# Patient Record
Sex: MALE | Race: WHITE | URBAN - METROPOLITAN AREA
[De-identification: names, ages, dates, MRNs, and addresses within clinical notes are randomized per-mention and may not be internally consistent; named-entity substitution may affect disease eponyms.]

---

## 2020-09-29 ENCOUNTER — OFFICE VISIT (OUTPATIENT)
Dept: PRIMARY CARE CLINIC | Age: 25
End: 2020-09-29
Payer: COMMERCIAL

## 2020-09-29 VITALS
HEIGHT: 69 IN | HEART RATE: 77 BPM | BODY MASS INDEX: 24.14 KG/M2 | DIASTOLIC BLOOD PRESSURE: 76 MMHG | WEIGHT: 163 LBS | SYSTOLIC BLOOD PRESSURE: 110 MMHG | OXYGEN SATURATION: 98 % | TEMPERATURE: 97 F

## 2020-09-29 PROCEDURE — 90471 IMMUNIZATION ADMIN: CPT | Performed by: FAMILY MEDICINE

## 2020-09-29 PROCEDURE — 99385 PREV VISIT NEW AGE 18-39: CPT | Performed by: FAMILY MEDICINE

## 2020-09-29 PROCEDURE — 90686 IIV4 VACC NO PRSV 0.5 ML IM: CPT | Performed by: FAMILY MEDICINE

## 2020-09-29 RX ORDER — MULTIVIT-MIN/IRON/FOLIC ACID/K 18-600-40
2000 CAPSULE ORAL DAILY
COMMUNITY

## 2020-09-29 ASSESSMENT — PATIENT HEALTH QUESTIONNAIRE - PHQ9
2. FEELING DOWN, DEPRESSED OR HOPELESS: 0
SUM OF ALL RESPONSES TO PHQ9 QUESTIONS 1 & 2: 0
SUM OF ALL RESPONSES TO PHQ QUESTIONS 1-9: 0
SUM OF ALL RESPONSES TO PHQ QUESTIONS 1-9: 0
1. LITTLE INTEREST OR PLEASURE IN DOING THINGS: 0

## 2020-09-29 ASSESSMENT — ENCOUNTER SYMPTOMS
COUGH: 0
EYE REDNESS: 0
SORE THROAT: 0
SHORTNESS OF BREATH: 0
DIARRHEA: 0
RHINORRHEA: 0
NAUSEA: 0
ABDOMINAL PAIN: 0
VOMITING: 0
WHEEZING: 0
EYE DISCHARGE: 0

## 2020-09-29 NOTE — PROGRESS NOTES
[address] [facility] PRIMARY CARE  [address] [facility] [address]  Dept: [phone]    Lucio Sender is a 31 y.o. male who presents today for his medical conditions/complaintsas noted below. Chief Complaint   Patient presents with    New Patient    Flu Vaccine       HPI:     HPI  Pt has not seen a physician on regular basis. Has high blood pressure on moms side. Grandmother had colon cancer, grandfather had prostate cancer. Father with MS. Patient requesting to be checked for STDs. Does not believe he has had an exposure but wants to be safe. No results found for: LDLCHOLESTEROL, LDLCALC    (goal LDL is <100)   No results found for: AST, ALT, BUN  BP Readings from Last 3 Encounters:   09/29/20 110/76          (goal 120/80)    History reviewed. No pertinent past medical history. History reviewed. No pertinent surgical history. Family History   Problem Relation Age of Onset    High Blood Pressure Maternal Grandfather     Colon Cancer Paternal Grandmother     Cancer Paternal Grandfather        Social History     Tobacco Use    Smoking status: Never Smoker    Smokeless tobacco: Never Used   Substance Use Topics    Alcohol use: Not on file      Current Outpatient Medications   Medication Sig Dispense Refill    Cholecalciferol (VITAMIN D) 50 MCG (2000 UT) CAPS capsule Take 2,000 Units by mouth daily       No current facility-administered medications for this visit.       No Known Allergies    Health Maintenance   Topic Date Due    Varicella vaccine (1 of 2 - 2-dose childhood series) 02/01/1995    HPV vaccine (1 - Male 2-dose series) 02/01/2005    HIV screen  02/01/2009    DTaP/Tdap/Td vaccine (1 - Tdap) 02/01/2013    Flu vaccine (1) 09/01/2020    Hepatitis A vaccine  Aged Out    Hepatitis B vaccine  Aged Out    Hib vaccine  Aged Out    Meningococcal (ACWY) vaccine  Aged Out    Pneumococcal 0-64 years Vaccine  Aged Out       Subjective: Review of Systems   Constitutional: Negative for chills and fever. HENT: Negative for rhinorrhea and sore throat. Eyes: Negative for discharge and redness. Respiratory: Negative for cough, shortness of breath and wheezing. Cardiovascular: Negative for chest pain and palpitations. Gastrointestinal: Negative for abdominal pain, diarrhea, nausea and vomiting. Genitourinary: Negative for dysuria and frequency. Musculoskeletal: Negative for arthralgias and myalgias. Neurological: Negative for dizziness, light-headedness and headaches. Psychiatric/Behavioral: Negative for sleep disturbance. Objective:     /76   Pulse 77   Temp 97 °F (36.1 °C)   Ht 5' 9\" (1.753 m)   Wt 163 lb (73.9 kg)   SpO2 98%   BMI 24.07 kg/m²   Physical Exam  Vitals signs and nursing note reviewed. Constitutional:       General: He is not in acute distress. Appearance: He is well-developed. He is not ill-appearing. HENT:      Head: Normocephalic and atraumatic. Right Ear: External ear normal.      Left Ear: External ear normal.   Eyes:      General: No scleral icterus. Right eye: No discharge. Left eye: No discharge. Conjunctiva/sclera: Conjunctivae normal.      Pupils: Pupils are equal, round, and reactive to light. Neck:      Thyroid: No thyromegaly. Trachea: No tracheal deviation. Cardiovascular:      Rate and Rhythm: Normal rate and regular rhythm. Heart sounds: Normal heart sounds. Pulmonary:      Effort: Pulmonary effort is normal. No respiratory distress. Breath sounds: Normal breath sounds. No wheezing. Lymphadenopathy:      Cervical: No cervical adenopathy. Skin:     General: Skin is warm. Findings: No rash. Neurological:      Mental Status: He is alert and oriented to person, place, and time. Psychiatric:         Mood and Affect: Mood normal.         Behavior: Behavior normal.         Thought Content:  Thought content normal. Assessment:       Diagnosis Orders   1. Annual physical exam  Basic Metabolic Panel, Fasting   2. Need for vaccination  INFLUENZA, QUADV, 0.5ML, 6 MO AND OLDER, IM, PF, PREFILL SYR OR SDV (FLUZONE QUADV, PF)   3. Encounter for lipid screening for cardiovascular disease  Lipid, Fasting   4. Possible exposure to STD  HIV-1 And HIV-2 Antibodies    T. Pallidum Ab    Chlamydia/GC DNA, Urine        Plan:    Flu shot today. Blood work ordered. First morning urine void to rule out gonorrhea and/or chlamydia. Return in about 1 year (around 9/29/2021). Orders Placed This Encounter   Procedures    Chlamydia/GC DNA, Urine     Standing Status:   Future     Standing Expiration Date:   9/29/2021    INFLUENZA, QUADV, 0.5ML, 6 MO AND OLDER, IM, PF, PREFILL SYR OR SDV (FLUZONE QUADV, PF)    Lipid, Fasting     Standing Status:   Future     Standing Expiration Date:   9/29/2021    Basic Metabolic Panel, Fasting     Standing Status:   Future     Standing Expiration Date:   9/29/2021    HIV-1 And HIV-2 Antibodies     Standing Status:   Future     Standing Expiration Date:   9/29/2021    T. Pallidum Ab     Standing Status:   Future     Standing Expiration Date:   9/29/2021     No orders of the defined types were placed in this encounter. Patient given educationalmaterials - see patient instructions. Discussed use, benefit, and side effectsof prescribed medications. All patient questions answered. Pt voiced understanding. Reviewed health maintenance. Instructed to continue current medications, diet andexercise. Patient agreed with treatment plan. Follow up as directed.      Electronicallysigned by Loyd Mehta MD on 9/29/2020 at 11:22 AM

## 2020-10-01 LAB
ANION GAP SERPL CALCULATED.3IONS-SCNC: NORMAL MMOL/L
BUN BLDV-MCNC: NORMAL MG/DL
BUN/CREAT BLD: NORMAL
CALCIUM SERPL-MCNC: NORMAL MG/DL
CHLORIDE BLD-SCNC: NORMAL MMOL/L
CHOLESTEROL, FASTING: 190
CO2: NORMAL
CREAT SERPL-MCNC: NORMAL MG/DL
GFR AFRICAN AMERICAN: NORMAL
GFR NON-AFRICAN AMERICAN: NORMAL
GLUCOSE FASTING: 87 MG/DL
HDLC SERPL-MCNC: 45 MG/DL (ref 35–70)
HIV AG/AB: NORMAL
LDL CHOLESTEROL CALCULATED: 124 MG/DL (ref 0–160)
POTASSIUM SERPL-SCNC: NORMAL MMOL/L
SODIUM BLD-SCNC: NORMAL MMOL/L
TRIGLYCERIDE, FASTING: 103

## 2020-11-02 ENCOUNTER — TELEPHONE (OUTPATIENT)
Dept: PRIMARY CARE CLINIC | Age: 25
End: 2020-11-02

## 2020-11-02 NOTE — TELEPHONE ENCOUNTER
If he has had no exposure and has no symptoms he does not need to be tested.   If He wore a mask on the flight he should be fine

## 2020-11-02 NOTE — TELEPHONE ENCOUNTER
Pt just came back on a flight from [place]. No symptoms, but thinks he should be tested, to be on the safe side. Asking what you think? Dr. Fernandez Maldonado pt.

## 2021-03-11 ENCOUNTER — OFFICE VISIT (OUTPATIENT)
Dept: PRIMARY CARE CLINIC | Age: 26
End: 2021-03-11
Payer: COMMERCIAL

## 2021-03-11 VITALS
TEMPERATURE: 97 F | HEART RATE: 72 BPM | SYSTOLIC BLOOD PRESSURE: 116 MMHG | DIASTOLIC BLOOD PRESSURE: 78 MMHG | BODY MASS INDEX: 25.72 KG/M2 | WEIGHT: 174.2 LBS | OXYGEN SATURATION: 98 %

## 2021-03-11 DIAGNOSIS — G47.19 EXCESSIVE DAYTIME SLEEPINESS: ICD-10-CM

## 2021-03-11 DIAGNOSIS — R53.83 FATIGUE, UNSPECIFIED TYPE: ICD-10-CM

## 2021-03-11 DIAGNOSIS — G47.8 WAKES UP DURING NIGHT: ICD-10-CM

## 2021-03-11 DIAGNOSIS — R51.9 NONINTRACTABLE EPISODIC HEADACHE, UNSPECIFIED HEADACHE TYPE: Primary | ICD-10-CM

## 2021-03-11 DIAGNOSIS — G47.9 RESTLESS SLEEPER: ICD-10-CM

## 2021-03-11 PROCEDURE — G8482 FLU IMMUNIZE ORDER/ADMIN: HCPCS | Performed by: PHYSICIAN ASSISTANT

## 2021-03-11 PROCEDURE — G8427 DOCREV CUR MEDS BY ELIG CLIN: HCPCS | Performed by: PHYSICIAN ASSISTANT

## 2021-03-11 PROCEDURE — 99213 OFFICE O/P EST LOW 20 MIN: CPT | Performed by: PHYSICIAN ASSISTANT

## 2021-03-11 PROCEDURE — 1036F TOBACCO NON-USER: CPT | Performed by: PHYSICIAN ASSISTANT

## 2021-03-11 PROCEDURE — G8419 CALC BMI OUT NRM PARAM NOF/U: HCPCS | Performed by: PHYSICIAN ASSISTANT

## 2021-03-11 SDOH — ECONOMIC STABILITY: INCOME INSECURITY: HOW HARD IS IT FOR YOU TO PAY FOR THE VERY BASICS LIKE FOOD, HOUSING, MEDICAL CARE, AND HEATING?: PATIENT DECLINED

## 2021-03-11 SDOH — ECONOMIC STABILITY: FOOD INSECURITY: WITHIN THE PAST 12 MONTHS, THE FOOD YOU BOUGHT JUST DIDN'T LAST AND YOU DIDN'T HAVE MONEY TO GET MORE.: PATIENT DECLINED

## 2021-03-11 SDOH — ECONOMIC STABILITY: FOOD INSECURITY: WITHIN THE PAST 12 MONTHS, YOU WORRIED THAT YOUR FOOD WOULD RUN OUT BEFORE YOU GOT MONEY TO BUY MORE.: PATIENT DECLINED

## 2021-03-11 ASSESSMENT — PATIENT HEALTH QUESTIONNAIRE - PHQ9
SUM OF ALL RESPONSES TO PHQ QUESTIONS 1-9: 1
2. FEELING DOWN, DEPRESSED OR HOPELESS: 1
SUM OF ALL RESPONSES TO PHQ QUESTIONS 1-9: 1

## 2021-03-11 NOTE — PROGRESS NOTES
[address] [facility] PRIMARY CARE  [address] [facility] [address]  Dept: [phone]    Seb Torres is a 31 y.o. male Established patient, who presents today for his medical conditions/complaintsas noted below. Chief Complaint   Patient presents with    Headache     every other day for the last 2 weeks    Fatigue     everyday around mid-day. HPI:     HPI   Had HAs in past, but not this frequently before. Normally gets a HA 1-2x per week, but says his HAs usually improve with water. Lately he gets a HA about every day. He drinks 1.5 L of water per day without relief. No family hx of migraines. HAs are located bilat on posterior head and top of his head. No N/V, fever, or blurred vision. No change in caffeine- Only drinks an occasional tea. Rare alcohol. Ibuprofen does not help. 2 tab of naproxen sometimes helps and other times does not. Has not tried excedrin. No hx of allergies, but has noticed slight congestion in mornings recently and eye itching 1 day. Neck hurts when he has the HA. Doing yoga and Tens unit. Pt is a - Computer seems to trigger HAs some days, but also had HA on weekend. Has blue light filtering glasses, but has not been wearing them for work. Fatigue for a couple months. Sleeps 7-8 hrs per night. Drinks milk on his cereal. Takes 2000 IU Vit D year round. Has not fallen asleep while driving, but has pulled over on road d/t drowsiness if driving more than 1 hr. Says he falls asleep at his desk every afternoon and he wonders about narcolepsy. Reviewed prior notes None  Reviewed previous Labs from 10/1/20    LDL Calculated (mg/dL)   Date Value   10/01/2020 124       (goal LDL is <100)   No results found for: AST, ALT, BUN, LABA1C, TSH  BP Readings from Last 3 Encounters:   03/11/21 116/78   09/29/20 110/76          (goal 120/80)    No past medical history on file.    No past surgical movements intact. Pupils: Pupils are equal, round, and reactive to light. Neck:      Comments: 14.75\" neck circumference  Cardiovascular:      Rate and Rhythm: Normal rate and regular rhythm. Pulmonary:      Effort: Pulmonary effort is normal.      Breath sounds: Normal breath sounds. Neurological:      Mental Status: He is alert. Cranial Nerves: No cranial nerve deficit. Assessment:       Diagnosis Orders   1. Nonintractable episodic headache, unspecified headache type     2. Fatigue, unspecified type  CBC Auto Differential    Vitamin D 25 Hydroxy    TSH With Reflex Ft4   3. Excessive daytime sleepiness  Baseline Diagnostic Sleep Study   4. Restless sleeper  Baseline Diagnostic Sleep Study   5. Wakes up during night  Baseline Diagnostic Sleep Study        Plan:    1) Increased HAs  NO red flags to suggest need for immediate head imaging.   -Try zyrtec for allergies and wear blue light filter glasses at work. -Given HA dairy sheets to look for triggers.   -See if excedrin is more effective at relieving HA than naproxen. If HAs are not improving, consider Rx for Fioricet. 2-5 Excessive daytime fatigue  -Check CBC, Vit D, and TSH  -Ordered sleep study (Pt scored a 2 on STOP-BANG and 9 on [place] Sleepiness Scale). If sleep study is negative for sleep apnea, consider referral to sleep specialist to evaluate for narcolepsy. Return if symptoms worsen or fail to improve.     Orders Placed This Encounter   Procedures    CBC Auto Differential     Standing Status:   Future     Standing Expiration Date:   3/12/2022    Vitamin D 25 Hydroxy     Standing Status:   Future     Standing Expiration Date:   3/11/2022    TSH With Reflex Ft4     Standing Status:   Future     Standing Expiration Date:   3/11/2022    Baseline Diagnostic Sleep Study     Standing Status:   Future     Standing Expiration Date:   3/11/2022     Order Specific Question:   Adult or Pediatric     Answer:   Adult Study (>7 Years)     Order Specific Question:   Location For Sleep Study     Answer: [facility] Specific Question:   Select Sleep Lab Location     Answer:   [address]     Order Specific Question:   Pre-Study Patient Questions: Answer:   Complains of daytime sleepiness     Order Specific Question:   Pre-Study Patient Questions: Answer: Tosses and Turns all night or describes their sleep as restless     Order Specific Question:   Pre-Study Patient Questions: Answer:   Reports multiple awakenings throughout the night     Order Specific Question:   Pre-Study Patient Questions: Answer:   Impaired attention, concentration, or memory due to poor sleep     No orders of the defined types were placed in this encounter. Patient given educationalmaterials - see patient instructions. Discussed use, benefit, and side effectsof prescribed medications. All patient questions answered. Pt voiced understanding. Reviewed health maintenance. Instructed to continue current medications, diet andexercise. Patient agreed with treatment plan. Follow up as directed.      Electronicallysigned by Syl Mae PA-C on 3/18/2021 at 12:34 AM

## 2021-03-18 ASSESSMENT — ENCOUNTER SYMPTOMS
VOMITING: 0
NAUSEA: 0
EYE ITCHING: 1
SORE THROAT: 0